# Patient Record
Sex: FEMALE | Race: WHITE | NOT HISPANIC OR LATINO
[De-identification: names, ages, dates, MRNs, and addresses within clinical notes are randomized per-mention and may not be internally consistent; named-entity substitution may affect disease eponyms.]

---

## 2017-02-03 ENCOUNTER — TRANSCRIPTION ENCOUNTER (OUTPATIENT)
Age: 52
End: 2017-02-03

## 2020-02-15 ENCOUNTER — TRANSCRIPTION ENCOUNTER (OUTPATIENT)
Age: 55
End: 2020-02-15

## 2021-03-05 PROBLEM — Z00.00 ENCOUNTER FOR PREVENTIVE HEALTH EXAMINATION: Status: ACTIVE | Noted: 2021-03-05

## 2021-03-15 DIAGNOSIS — Z98.890 OTHER SPECIFIED POSTPROCEDURAL STATES: ICD-10-CM

## 2021-03-15 DIAGNOSIS — Z80.0 FAMILY HISTORY OF MALIGNANT NEOPLASM OF DIGESTIVE ORGANS: ICD-10-CM

## 2021-03-30 ENCOUNTER — APPOINTMENT (OUTPATIENT)
Dept: BREAST CENTER | Facility: CLINIC | Age: 56
End: 2021-03-30
Payer: COMMERCIAL

## 2021-03-30 VITALS
HEART RATE: 80 BPM | SYSTOLIC BLOOD PRESSURE: 138 MMHG | WEIGHT: 138 LBS | BODY MASS INDEX: 23.56 KG/M2 | DIASTOLIC BLOOD PRESSURE: 90 MMHG | HEIGHT: 64 IN

## 2021-03-30 DIAGNOSIS — N60.12 DIFFUSE CYSTIC MASTOPATHY OF LEFT BREAST: ICD-10-CM

## 2021-03-30 DIAGNOSIS — Z98.82 BREAST IMPLANT STATUS: ICD-10-CM

## 2021-03-30 DIAGNOSIS — R92.2 INCONCLUSIVE MAMMOGRAM: ICD-10-CM

## 2021-03-30 DIAGNOSIS — N60.11 DIFFUSE CYSTIC MASTOPATHY OF LEFT BREAST: ICD-10-CM

## 2021-03-30 DIAGNOSIS — Z87.891 PERSONAL HISTORY OF NICOTINE DEPENDENCE: ICD-10-CM

## 2021-03-30 PROCEDURE — 99072 ADDL SUPL MATRL&STAF TM PHE: CPT

## 2021-03-30 PROCEDURE — 99213 OFFICE O/P EST LOW 20 MIN: CPT

## 2021-03-30 NOTE — HISTORY OF PRESENT ILLNESS
[FreeTextEntry1] : Dense breast with fibrocystic changes\par Status post left benign breast biopsy\par Patient denies any breast masses or nipple discharge.  Patient is late for her mammogram and ultrasound.  Patient has no family history of breast cancer.  In 2009 patient had silicone implants, but cannot tolerate breast MRI due to claustrophobia.

## 2021-03-30 NOTE — PAST MEDICAL HISTORY
[Menstruating] : The patient is menstruating [Definite ___ (Date)] : the last menstrual period was [unfilled] [Total Preg ___] : G[unfilled] [Live Births ___] : P[unfilled]  [Age At Live Birth ___] : Age at live birth: [unfilled]

## 2021-03-30 NOTE — PHYSICAL EXAM
[No Supraclavicular Adenopathy] : no supraclavicular adenopathy [No Cervical Adenopathy] : no cervical adenopathy [Examined in the supine and seated position] : examined in the supine and seated position [Symmetrical] : symmetrical [No dominant masses] : no dominant masses in right breast  [No dominant masses] : no dominant masses left breast [No Nipple Retraction] : no left nipple retraction [No Nipple Discharge] : no left nipple discharge [No Axillary Lymphadenopathy] : no left axillary lymphadenopathy [No Rashes] : no rashes [de-identified] : Status post bilateral lift with implants, implants intact.

## 2021-08-26 ENCOUNTER — APPOINTMENT (OUTPATIENT)
Dept: BREAST CENTER | Facility: CLINIC | Age: 56
End: 2021-08-26

## 2023-12-03 ENCOUNTER — NON-APPOINTMENT (OUTPATIENT)
Age: 58
End: 2023-12-03

## 2024-09-21 ENCOUNTER — HOSPITAL ENCOUNTER (INPATIENT)
Dept: HOSPITAL 74 - FER | Age: 59
LOS: 1 days | Discharge: HOME | DRG: 399 | End: 2024-09-22
Attending: FAMILY MEDICINE | Admitting: FAMILY MEDICINE
Payer: COMMERCIAL

## 2024-09-21 VITALS — RESPIRATION RATE: 18 BRPM

## 2024-09-21 VITALS — BODY MASS INDEX: 24.3 KG/M2

## 2024-09-21 DIAGNOSIS — K35.80: Primary | ICD-10-CM

## 2024-09-21 LAB
ALBUMIN SERPL-MCNC: 4.3 G/DL (ref 3.4–5)
ALP SERPL-CCNC: 50 U/L (ref 45–117)
ALT SERPL-CCNC: 20 U/L (ref 13–61)
ANION GAP SERPL CALC-SCNC: 10 MMOL/L (ref 4–13)
AST SERPL-CCNC: 12 U/L (ref 15–37)
BASOPHILS # BLD: 0.3 % (ref 0–2)
BILIRUB SERPL-MCNC: 0.6 MG/DL (ref 0.2–1)
BUN SERPL-MCNC: 14.2 MG/DL (ref 7–18)
CALCIUM SERPL-MCNC: 9.2 MG/DL (ref 8.5–10.1)
CHLORIDE SERPL-SCNC: 97 MMOL/L (ref 98–107)
CO2 SERPL-SCNC: 27 MMOL/L (ref 21–32)
CREAT SERPL-MCNC: 0.7 MG/DL (ref 0.55–1.3)
DEPRECATED RDW RBC AUTO: 14.7 % (ref 11.6–15.6)
EOSINOPHIL # BLD: 0.1 % (ref 0–4.5)
GLUCOSE SERPL-MCNC: 117 MG/DL (ref 74–106)
HCT VFR BLD CALC: 39.1 % (ref 32.4–45.2)
HGB BLD-MCNC: 12.8 GM/DL (ref 10.7–15.3)
LYMPHOCYTES # BLD: 8.6 % (ref 8–40)
MCH RBC QN AUTO: 27.4 PG (ref 25.7–33.7)
MCHC RBC AUTO-ENTMCNC: 32.7 G/DL (ref 32–36)
MCV RBC: 83.7 FL (ref 80–96)
MONOCYTES # BLD AUTO: 6.9 % (ref 3.8–10.2)
NEUTROPHILS # BLD: 84.1 % (ref 42.8–82.8)
PLATELET # BLD AUTO: 244 10^3/UL (ref 134–434)
PMV BLD: 8.1 FL (ref 7.5–11.1)
POTASSIUM SERPLBLD-SCNC: 3.7 MMOL/L (ref 3.5–5.1)
PROT SERPL-MCNC: 7.1 G/DL (ref 6.4–8.2)
RBC # BLD AUTO: 4.67 M/MM3 (ref 3.6–5.2)
SODIUM SERPL-SCNC: 134 MMOL/L (ref 136–145)
WBC # BLD AUTO: 14.8 K/MM3 (ref 4–10)

## 2024-09-21 PROCEDURE — 0DTJ4ZZ RESECTION OF APPENDIX, PERCUTANEOUS ENDOSCOPIC APPROACH: ICD-10-PCS | Performed by: SURGERY

## 2024-09-21 RX ADMIN — MORPHINE SULFATE ONE MG: 4 INJECTION, SOLUTION INTRAMUSCULAR; INTRAVENOUS at 07:27

## 2024-09-21 RX ADMIN — PIPERACILLIN AND TAZOBACTAM ONE MLS/HR: 4; .5 INJECTION, POWDER, LYOPHILIZED, FOR SOLUTION INTRAVENOUS at 07:26

## 2024-09-21 RX ADMIN — ACETAMINOPHEN PRN MG: 10 INJECTION, SOLUTION INTRAVENOUS at 14:05

## 2024-09-21 RX ADMIN — ONDANSETRON PRN MG: 2 INJECTION INTRAMUSCULAR; INTRAVENOUS at 15:19

## 2024-09-21 RX ADMIN — BUPIVACAINE HYDROCHLORIDE ONE MG: 5 INJECTION, SOLUTION EPIDURAL; INTRACAUDAL at 11:42

## 2024-09-21 RX ADMIN — ACETAMINOPHEN ONE MG: 10 INJECTION, SOLUTION INTRAVENOUS at 04:45

## 2024-09-21 RX ADMIN — ALUMINUM HYDROXIDE, MAGNESIUM HYDROXIDE, AND SIMETHICONE ONE ML: 200; 200; 20 SUSPENSION ORAL at 04:46

## 2024-09-21 RX ADMIN — SODIUM CHLORIDE ONE ML: 9 INJECTION, SOLUTION INTRAVENOUS at 04:45

## 2024-09-21 RX ADMIN — SODIUM CHLORIDE, POTASSIUM CHLORIDE, SODIUM LACTATE AND CALCIUM CHLORIDE SCH MLS/HR: 600; 310; 30; 20 INJECTION, SOLUTION INTRAVENOUS at 12:32

## 2024-09-21 RX ADMIN — SODIUM CHLORIDE, POTASSIUM CHLORIDE, SODIUM LACTATE AND CALCIUM CHLORIDE SCH MLS/HR: 600; 310; 30; 20 INJECTION, SOLUTION INTRAVENOUS at 12:59

## 2024-09-21 RX ADMIN — ONDANSETRON ONE MG: 2 INJECTION INTRAMUSCULAR; INTRAVENOUS at 08:04

## 2024-09-22 VITALS — TEMPERATURE: 99.5 F | DIASTOLIC BLOOD PRESSURE: 69 MMHG | SYSTOLIC BLOOD PRESSURE: 103 MMHG | HEART RATE: 76 BPM

## 2024-10-04 ENCOUNTER — HOSPITAL ENCOUNTER (OUTPATIENT)
Dept: HOSPITAL 74 - JER | Age: 59
Setting detail: OBSERVATION
LOS: 1 days | Discharge: HOME | End: 2024-10-05
Attending: FAMILY MEDICINE | Admitting: FAMILY MEDICINE
Payer: COMMERCIAL

## 2024-10-04 VITALS — BODY MASS INDEX: 24 KG/M2

## 2024-10-04 DIAGNOSIS — Z88.8: ICD-10-CM

## 2024-10-04 DIAGNOSIS — Z88.5: ICD-10-CM

## 2024-10-04 DIAGNOSIS — L02.211: ICD-10-CM

## 2024-10-04 DIAGNOSIS — F17.200: ICD-10-CM
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
## 2024-10-04 DIAGNOSIS — T81.49XA: Primary | ICD-10-CM

## 2024-10-04 DIAGNOSIS — Z87.442: ICD-10-CM

## 2024-10-04 LAB
ALBUMIN SERPL-MCNC: 3.4 G/DL (ref 3.4–5)
ALP SERPL-CCNC: 67 U/L (ref 45–117)
ALT SERPL-CCNC: 23 U/L (ref 13–61)
ANION GAP SERPL CALC-SCNC: 8 MMOL/L (ref 4–13)
ANISOCYTOSIS BLD QL: (no result)
AST SERPL-CCNC: 12 U/L (ref 15–37)
BASOPHILS # BLD: 0.4 % (ref 0–2)
BUN SERPL-MCNC: 20.8 MG/DL (ref 7–18)
CALCIUM SERPL-MCNC: 9.4 MG/DL (ref 8.5–10.1)
CHLORIDE SERPL-SCNC: 105 MMOL/L (ref 98–107)
CO2 SERPL-SCNC: 28 MMOL/L (ref 21–32)
CREAT SERPL-MCNC: 0.6 MG/DL (ref 0.55–1.3)
DEPRECATED RDW RBC AUTO: 14.5 % (ref 11.6–15.6)
EOSINOPHIL # BLD: 1.6 % (ref 0–4.5)
GLUCOSE SERPL-MCNC: 97 MG/DL (ref 74–106)
HCT VFR BLD CALC: 35.3 % (ref 32.4–45.2)
HGB BLD-MCNC: 11.6 GM/DL (ref 10.7–15.3)
LYMPHOCYTES # BLD: 20 % (ref 8–40)
MACROCYTES BLD QL: 0
MCH RBC QN AUTO: 27.3 PG (ref 25.7–33.7)
MCHC RBC AUTO-ENTMCNC: 32.8 G/DL (ref 32–36)
MCV RBC: 83.4 FL (ref 80–96)
MONOCYTES # BLD AUTO: 5.8 % (ref 3.8–10.2)
NEUTROPHILS # BLD: 72.2 % (ref 42.8–82.8)
PLATELET # BLD AUTO: 435 10^3/UL (ref 134–434)
PMV BLD: 6.6 FL (ref 7.5–11.1)
POTASSIUM SERPLBLD-SCNC: 3.9 MMOL/L (ref 3.5–5.1)
PROT SERPL-MCNC: 6.6 G/DL (ref 6.4–8.2)
RBC # BLD AUTO: 4.23 M/MM3 (ref 3.6–5.2)
SODIUM SERPL-SCNC: 141 MMOL/L (ref 136–145)
WBC # BLD AUTO: 11.2 K/MM3 (ref 4–10)

## 2024-10-04 PROCEDURE — G0378 HOSPITAL OBSERVATION PER HR: HCPCS

## 2024-10-04 RX ADMIN — ONDANSETRON ONE MG: 2 INJECTION INTRAMUSCULAR; INTRAVENOUS at 23:00

## 2024-10-05 VITALS
RESPIRATION RATE: 18 BRPM | HEART RATE: 70 BPM | TEMPERATURE: 98.2 F | DIASTOLIC BLOOD PRESSURE: 66 MMHG | SYSTOLIC BLOOD PRESSURE: 127 MMHG

## 2024-10-05 LAB
ANION GAP SERPL CALC-SCNC: 3 MMOL/L (ref 4–13)
BASOPHILS # BLD: 0.3 % (ref 0–2)
BILIRUB SERPL-MCNC: < 0.1 MG/DL (ref 0.2–1)
BUN SERPL-MCNC: 16.8 MG/DL (ref 7–18)
CALCIUM SERPL-MCNC: 8.8 MG/DL (ref 8.5–10.1)
CHLORIDE SERPL-SCNC: 106 MMOL/L (ref 98–107)
CO2 SERPL-SCNC: 29 MMOL/L (ref 21–32)
CREAT SERPL-MCNC: 0.6 MG/DL (ref 0.55–1.3)
DEPRECATED RDW RBC AUTO: 14.4 % (ref 11.6–15.6)
EOSINOPHIL # BLD: 1.8 % (ref 0–4.5)
GLUCOSE SERPL-MCNC: 111 MG/DL (ref 74–106)
HCT VFR BLD CALC: 33 % (ref 32.4–45.2)
HGB BLD-MCNC: 10.8 GM/DL (ref 10.7–15.3)
LYMPHOCYTES # BLD: 24.4 % (ref 8–40)
MCH RBC QN AUTO: 27.6 PG (ref 25.7–33.7)
MCHC RBC AUTO-ENTMCNC: 32.8 G/DL (ref 32–36)
MCV RBC: 83.9 FL (ref 80–96)
MONOCYTES # BLD AUTO: 6.5 % (ref 3.8–10.2)
NEUTROPHILS # BLD: 67 % (ref 42.8–82.8)
PLATELET # BLD AUTO: 397 10^3/UL (ref 134–434)
PMV BLD: 6.5 FL (ref 7.5–11.1)
POTASSIUM SERPLBLD-SCNC: 4.1 MMOL/L (ref 3.5–5.1)
RBC # BLD AUTO: 3.94 M/MM3 (ref 3.6–5.2)
SODIUM SERPL-SCNC: 138 MMOL/L (ref 136–145)
WBC # BLD AUTO: 8.9 K/MM3 (ref 4–10)

## 2024-10-05 PROCEDURE — 3E03329 INTRODUCTION OF OTHER ANTI-INFECTIVE INTO PERIPHERAL VEIN, PERCUTANEOUS APPROACH: ICD-10-PCS | Performed by: FAMILY MEDICINE

## 2024-10-05 PROCEDURE — 3E033GC INTRODUCTION OF OTHER THERAPEUTIC SUBSTANCE INTO PERIPHERAL VEIN, PERCUTANEOUS APPROACH: ICD-10-PCS | Performed by: FAMILY MEDICINE

## 2024-10-05 PROCEDURE — 0H97XZX DRAINAGE OF ABDOMEN SKIN, EXTERNAL APPROACH, DIAGNOSTIC: ICD-10-PCS | Performed by: FAMILY MEDICINE

## 2024-10-05 PROCEDURE — 3E033NZ INTRODUCTION OF ANALGESICS, HYPNOTICS, SEDATIVES INTO PERIPHERAL VEIN, PERCUTANEOUS APPROACH: ICD-10-PCS | Performed by: FAMILY MEDICINE

## 2024-10-05 RX ADMIN — ONDANSETRON ONE MG: 2 INJECTION INTRAMUSCULAR; INTRAVENOUS at 12:40

## 2024-10-05 RX ADMIN — LIDOCAINE HYDROCHLORIDE ONE: 10 INJECTION, SOLUTION INFILTRATION; PERINEURAL at 15:09

## 2024-10-05 RX ADMIN — CLINDAMYCIN PHOSPHATE ONE MLS/HR: 300 INJECTION, SOLUTION INTRAVENOUS at 03:03

## 2024-10-05 RX ADMIN — MORPHINE SULFATE ONE: 4 INJECTION, SOLUTION INTRAMUSCULAR; INTRAVENOUS at 02:13

## 2024-10-05 RX ADMIN — ONDANSETRON PRN MG: 4 TABLET, ORALLY DISINTEGRATING ORAL at 12:26

## 2024-10-05 RX ADMIN — ONDANSETRON ONE: 2 INJECTION INTRAMUSCULAR; INTRAVENOUS at 13:51

## 2024-10-05 RX ADMIN — ACETAMINOPHEN PRN MG: 10 INJECTION, SOLUTION INTRAVENOUS at 09:13

## 2024-10-05 RX ADMIN — DEXTROSE AND SODIUM CHLORIDE SCH MLS/HR: 5; 450 INJECTION, SOLUTION INTRAVENOUS at 05:44

## 2024-10-05 RX ADMIN — CLINDAMYCIN PHOSPHATE SCH MLS/HR: 600 INJECTION, SOLUTION INTRAVENOUS at 09:12

## 2024-10-05 RX ADMIN — ACETAMINOPHEN ONE: 10 INJECTION, SOLUTION INTRAVENOUS at 07:01

## 2024-10-05 RX ADMIN — ACETAMINOPHEN ONE MG: 10 INJECTION, SOLUTION INTRAVENOUS at 02:20

## 2024-10-05 RX ADMIN — DOCUSATE SODIUM SCH MG: 100 CAPSULE, LIQUID FILLED ORAL at 09:13

## 2025-03-20 ENCOUNTER — NON-APPOINTMENT (OUTPATIENT)
Age: 60
End: 2025-03-20